# Patient Record
Sex: MALE | Race: WHITE | URBAN - METROPOLITAN AREA
[De-identification: names, ages, dates, MRNs, and addresses within clinical notes are randomized per-mention and may not be internally consistent; named-entity substitution may affect disease eponyms.]

---

## 2022-07-27 ENCOUNTER — HOSPITAL ENCOUNTER (OUTPATIENT)
Dept: LAB | Age: 72
Discharge: HOME | End: 2022-07-30

## 2022-07-27 PROCEDURE — 88305 TISSUE EXAM BY PATHOLOGIST: CPT

## 2023-10-25 ENCOUNTER — OFFICE VISIT (OUTPATIENT)
Dept: UROLOGY | Age: 73
End: 2023-10-25

## 2023-10-25 DIAGNOSIS — N40.0 BENIGN PROSTATIC HYPERPLASIA WITHOUT LOWER URINARY TRACT SYMPTOMS: Primary | ICD-10-CM

## 2023-10-25 LAB
BILIRUBIN, URINE, POC: NEGATIVE
BLOOD URINE, POC: NEGATIVE
GLUCOSE URINE, POC: NEGATIVE
KETONES, URINE, POC: NEGATIVE
LEUKOCYTE ESTERASE, URINE, POC: NEGATIVE
NITRITE, URINE, POC: NEGATIVE
PH, URINE, POC: 6 (ref 4.6–8)
PROTEIN,URINE, POC: NEGATIVE
PVR, POC: 47 CC
SPECIFIC GRAVITY, URINE, POC: 1.02 (ref 1–1.03)
URINALYSIS CLARITY, POC: NORMAL
URINALYSIS COLOR, POC: NORMAL
UROBILINOGEN, POC: NORMAL

## 2023-10-25 PROCEDURE — 1123F ACP DISCUSS/DSCN MKR DOCD: CPT | Performed by: UROLOGY

## 2023-10-25 PROCEDURE — 81003 URINALYSIS AUTO W/O SCOPE: CPT | Performed by: UROLOGY

## 2023-10-25 PROCEDURE — 51798 US URINE CAPACITY MEASURE: CPT | Performed by: UROLOGY

## 2023-10-25 PROCEDURE — 99204 OFFICE O/P NEW MOD 45 MIN: CPT | Performed by: UROLOGY

## 2023-10-25 RX ORDER — DIAZEPAM 10 MG/1
10 TABLET ORAL ONCE
Qty: 1 TABLET | Refills: 0 | Status: SHIPPED | OUTPATIENT
Start: 2023-10-25 | End: 2023-10-25

## 2023-10-25 RX ORDER — TADALAFIL 5 MG/1
5 TABLET ORAL DAILY
COMMUNITY
Start: 2019-04-16

## 2023-10-25 RX ORDER — TAMSULOSIN HYDROCHLORIDE 0.4 MG/1
CAPSULE ORAL
COMMUNITY
Start: 2023-09-14

## 2023-10-25 ASSESSMENT — ENCOUNTER SYMPTOMS
NAUSEA: 0
DIARRHEA: 0
SKIN LESIONS: 0
ABDOMINAL PAIN: 0
BACK PAIN: 0
COUGH: 0
CONSTIPATION: 0
BLOOD IN STOOL: 0
INDIGESTION: 0
HEARTBURN: 0
VOMITING: 0
EYE DISCHARGE: 0
EYE PAIN: 0
SHORTNESS OF BREATH: 0
WHEEZING: 0

## 2023-10-25 NOTE — PROGRESS NOTES
Financial Resource Strain: Not on file   Food Insecurity: Not on file   Transportation Needs: Not on file   Physical Activity: Not on file   Stress: Not on file   Social Connections: Not on file   Intimate Partner Violence: Not on file   Housing Stability: Not on file     Family History   Problem Relation Age of Onset    Cancer Mother         colon    Hypertension Father        Review of Systems  Constitutional:   Negative for fever, chills, appetite change, malaise/fatigue, headaches and weight loss. Skin:  Negative for skin lesions, rash and itching. Eyes:  Negative for visual disturbance, eye pain and eye discharge. ENT:  Negative for difficulty articulating words, pain swallowing, high frequency hearing loss and dry mouth. Respiratory:  Negative for cough, blood in sputum, shortness of breath and wheezing. Cardiovascular:  Negative for chest pain, hypertension, irregular heartbeat, leg pain, leg swelling, regular rate and rhythm and varicose veins. GI:  Negative for nausea, vomiting, abdominal pain, blood in stool, constipation, diarrhea, indigestion and heartburn. Genitourinary: Positive for history of urolithiasis, nocturia, slower stream, urgency, frequent urination and incomplete emptying. Negative for urinary burning, hematuria, flank pain, recurrent UTIs, straining, leakage w/ urge, erectile dysfunction, testicular pain, sexually transmitted disease, sexually transmitted disease, discharge, urethral stricture, menstrual problem, endometriosis, vaginal pain and hysterectomy. Musculoskeletal:  Negative for back pain, bone pain, arthralgias, tenderness, muscle weakness and neck pain. Neurological:  Negative for dizziness, focal weakness, numbness, seizures and tremors. Psychological:  Negative for depression and psychiatric problem. Endocrine: Positive for cold intolerance. Negative for thirst, excessive urination, fatigue and heat intolerance.   Hem/Lymphatic:  Negative for easy bleeding, easy

## 2023-11-03 ENCOUNTER — TELEPHONE (OUTPATIENT)
Dept: UROLOGY | Age: 73
End: 2023-11-03

## 2023-11-03 NOTE — TELEPHONE ENCOUNTER
Pt LM asking for price of cysto and CPT codes for Urolift. Gave him two codes for Urolift and number to billing for Cysto price.

## 2024-09-19 ENCOUNTER — TELEPHONE (OUTPATIENT)
Dept: UROLOGY | Age: 74
End: 2024-09-19

## 2024-09-19 NOTE — TELEPHONE ENCOUNTER
Return for for cystoscopy.// NO PA REQUIRED - AUTH [id number]   Pt is rescheduled for 10/30/24 @ 2pm

## 2024-10-22 ENCOUNTER — TELEPHONE (OUTPATIENT)
Dept: UROLOGY | Age: 74
End: 2024-10-22

## 2024-10-22 NOTE — TELEPHONE ENCOUNTER
Patient called regarding upcoming procedure. He is under the impression his cysto is to look at the size of his prostate and he currently has a new ct and mri. He states he does not want to do the cysto if it is not necessary. Please call him and explain.

## 2024-10-23 NOTE — TELEPHONE ENCOUNTER
[facility] Outpatient Diabetes Self-Management Education Program      Dsme RECORD and PROGRESS NOTE         Patient: Leda Silverio : 1957         Physician: RBENDA Jackson Date: 2019     VISIT No: [x]1 []2 []3 []4 []5 []6               [x] Initial                   [] Subsequent Year ACTUAL hours furnished: PROGRESS NOTE    A1C:7.4  Date A1C drawn:  2019       [x] Initial     [] Updated    Height: 65\"  Weight: 271.6             Date: 2019  BMI: 45.3       [x] Initial     [] Updated                   Weight Change:  [] Up    [] Down   lbs (Only for follow-up visit)     Diabetes Meds:  [] None   [] List: (Provide list at initial visit only list changes at follow-up visits)  [] No change     Metformin  Trulicity    Other Meds:  [] BP   [] lipids          [] depression/anxiety        [] other:        Behavior Goals Set Related To:   (List at initial visit only and add any new goals, if any, from follow-up visits)  [] Healthy eating      [] Being active  [] Taking meds        [] Monitoring   [] Reducing risks     [] Healthy coping  [] Problem-solving  []  Other:    [x] Separate Behavior Form Attached (use to assess progress on previously set goals)      [] Reviewed achievement of goals    Notes: Pt attended Class I of the 3 class DSME series. Pt was engaged and asked appropriate questions. Pt will attend class II on Wednesday, 2019.                  [x] Program schedule and visit dates                  [] 0.5     [] 1    [] 1.5    [] 2     [] 2.5     [x] 3    Format:   [] Individual       [] Group    Also present:   []  Family:     [] Friend:     [] Caregiver:        [] Other:                  [x] Behavior goals:              [x] set goals  [] review achievement     [x] Prediabetes and Diabetes biology & diagnosis     [x] BG goals and targets        [x] A1c test, meaning and goal     [x] Diabetes causes & risk factors        [x] Purpose of Healthy Eating & Spoke with Pt. He states he cannot take Flomax after Urolift d/t afib. Canceled cysto again. He wants to look up other options.   tips     [x] Body weight: [x] healthy wt             [x] risks when overwt/obese     [x] Carbohydrate food sources & counting     [x] Lean proteins & healthy fats     [x] How foods/drinks affect BG     [x] How to read a Food Label     [x] Healthy Plate Method     [x] Benefits of Being Active     [x] Different types of exercise     [x] Recommendations & safety issues for exercise     [x] Diabetes identification (on body, in wallet/purse)     [x] Obstacles to behavior change     [x] S.M.A.R.T.  Goals     [] Taking medications:      [] oral   []  injections   [] insulin     [] Understanding types of meds     [] Proper storage and handling of meds     [] OTC and alternative meds     [] Purpose & benefits of Monitoring     [] Causes, prevention & treatment for hypo- & hyperglycemia     [] General guidelines for monitoring     [] SMBG and monitoring schedule     [] BG logs       [] reviewed logs     [] Pattern management with SMBG log     [] BG meter receipt              Type/name of:                                    [] BG meter review     [] Reducing risks to prevent chronic complications     []  Monitor health status:             []  Eye exam    [] BP                        []  Lipids/heart disease            []  Nerve/Neuropathy             [] Foot care              []  Dental      [] Sleep                    []  Kidney      [] other:     [] Obstacles to behavior change      [] Problem solving skills to overcome barriers and challenges     [] The process and tips for problem solving     [] Problem solving for hypo- & hyperglycemia     [] Sick Day Management     [] Healthy Coping & stress management     [] DSM support plan      [] Individual assessment     [] Individual education plan and needs     []  Review initial assessment; correct knowledge deficits      []  Patient-selected topics:          []  Other topic(s):           Educator Signature: Mike Alvraado RD, 11/13/2019 5:20 PM